# Patient Record
Sex: FEMALE | Race: WHITE | NOT HISPANIC OR LATINO | Employment: OTHER | URBAN - METROPOLITAN AREA
[De-identification: names, ages, dates, MRNs, and addresses within clinical notes are randomized per-mention and may not be internally consistent; named-entity substitution may affect disease eponyms.]

---

## 2021-09-29 ENCOUNTER — CLINICAL SUPPORT (OUTPATIENT)
Dept: URGENT CARE | Facility: CLINIC | Age: 66
End: 2021-09-29
Payer: COMMERCIAL

## 2021-09-29 DIAGNOSIS — Z11.52 ENCOUNTER FOR SCREENING FOR COVID-19: Primary | ICD-10-CM

## 2021-09-29 PROCEDURE — U0002: ICD-10-PCS | Mod: QW,S$GLB,, | Performed by: NURSE PRACTITIONER

## 2021-09-29 PROCEDURE — U0002 COVID-19 LAB TEST NON-CDC: HCPCS | Mod: QW,S$GLB,, | Performed by: NURSE PRACTITIONER

## 2021-09-29 PROCEDURE — 99211 PR OFFICE/OUTPT VISIT, EST, LEVL I: ICD-10-PCS | Mod: S$GLB,,, | Performed by: NURSE PRACTITIONER

## 2021-09-29 PROCEDURE — 99211 OFF/OP EST MAY X REQ PHY/QHP: CPT | Mod: S$GLB,,, | Performed by: NURSE PRACTITIONER

## 2021-10-01 LAB
CTP QC/QA: YES
SARS-COV-2 RDRP RESP QL NAA+PROBE: NEGATIVE

## 2021-12-02 PROBLEM — N95.1 MENOPAUSAL SYNDROME: Status: ACTIVE | Noted: 2021-12-02

## 2025-01-13 ENCOUNTER — TELEPHONE (OUTPATIENT)
Dept: ORTHOPEDICS | Facility: CLINIC | Age: 70
End: 2025-01-13
Payer: COMMERCIAL

## 2025-01-13 NOTE — TELEPHONE ENCOUNTER
Spoke with pt informed pt she needs to get her images on a disc from previous clinic bring to our clinic to get uploaded to her chart pt v/u.

## 2025-01-13 NOTE — TELEPHONE ENCOUNTER
----- Message from Katja sent at 1/13/2025  8:14 AM CST -----  Contact: patient  Diana Rogel would like a call back [phone], in regards to her appt scheduled on 1/16. Pt states she recently had xrays on her foot at [facility],and she would like for the images to be requested.

## 2025-01-15 NOTE — PROGRESS NOTES
Dr. Halley Hollingsworth      [address], [place] [address]   Phone [phone]  Fax [phone]           CHIEF COMPLAINT: Injury, Pain, and Swelling of the Right Foot (Pain:5/10/DOI:01/11/2025)       HISTORY OF PRESENT ILLNESS (JN) (01/16/2025):    Diana presents with a right foot injury that occurred on January 12th while putting up [employer] decorations. She tripped on a long basket with heavy handles, catching her foot in the handle and falling over while carrying a box. She believes her foot twisted more than just falling forward. She rates her pain as 5 out of 6 in severity. Diana delayed seeking immediate care due to cataract surgery on the following Tuesday. She reports sensitivity in the area, but no bruising underneath. She has a tingling, stinging sensation when lifting her toes. Swelling was mainly in the midfoot area towards the toes, with slight discoloration described as slightly pinker than her other foot. Diana is concerned about the right foot injury affecting her ability to drive.  She was seen at [place] after hours.    She denies injuring anything else besides her foot.      SURGICAL HISTORY:  - Cataract surgery: Tuesday (recent)          PAST MEDICAL HISTORY:    Past Medical History:   Diagnosis Date    Anxiety     Cervical dysplasia     CIN1    Cystocele with rectocele     Depression     H/O umbilical hernia repair     Hyperlipidemia     Hypertension     Hypothyroidism     Insulin resistance     Menopausal syndrome     Osteoporosis     hip    Uterine prolapse     Vulvar abscess        Hemoglobin A1C   Date Value Ref Range Status   11/15/2024 5.9 (H) 4.2 - 5.8 % Final   07/03/2024 5.9 (H) 4.2 - 5.8 % Final   02/16/2024 5.7 4.2 - 5.8 % Final        PAST SURGICAL HISTORY:    Past Surgical History:   Procedure Laterality Date    ANTERIOR VAGINAL REPAIR      COLPOSCOPY      HYSTERECTOMY      HYSTERECTOMY, VAGINAL, LAPAROSCOPY-ASSISTED, WITH SALPINGO-OOPHORECTOY       LAVH/BSO, A&P REPAIR    OPERATION ON BARTHOLIN'S GLAND      DRAINAGE    POSTERIOR REPAIR      UMBILICAL HERNIA REPAIR          MEDICATIONS:    Current Outpatient Medications:     busPIRone (BUSPAR) 15 MG tablet, buspirone 15 mg oral tablet take 1 tablet (15 mg) by oral route 2 times per day   Active, Disp: , Rfl:     calcium carbonate/vitamin D3 (CALCIUM 600 + D,3, ORAL), , Disp: , Rfl:     citalopram (CELEXA) 40 MG tablet, Celexa 40 mg oral tablet take 1 tablet (40 mg) by oral route once daily   Active, Disp: , Rfl:     levothyroxine (SYNTHROID) 75 MCG tablet, levothyroxine 75 mcg oral tablet take 1 tablet (75 mcg) by oral route once daily   Active, Disp: , Rfl:     nortriptyline (PAMELOR) 10 MG capsule, Take 10 mg by mouth every evening., Disp: , Rfl:     simvastatin (ZOCOR) 20 MG tablet, , Disp: , Rfl:     alendronate (FOSAMAX) 70 MG tablet, , Disp: , Rfl:      There are no discontinued medications.      ALLERGIES:     Review of patient's allergies indicates:   Allergen Reactions    Cephalexin Rash            FAMILY HISTORY:   Family History   Problem Relation Name Age of Onset    Breast cancer Other RELATIVE     Cervical cancer Other RELATIVE     Uterine cancer Other RELATIVE     Diabetes Other RELATIVE            SOCIAL HISTORY:    Social History     Socioeconomic History    Marital status:    Tobacco Use    Smoking status: Never    Smokeless tobacco: Never   Substance and Sexual Activity    Alcohol use: Yes    Drug use: Never    Sexual activity: Yes     Partners: Male     Social Drivers of Health     Financial Resource Strain: Low Risk  (8/2/2023)    Received from [facility] and Its Subsidiaries and Affiliates    Overall Financial Resource Strain (CARDIA)     Difficulty of Paying Living Expenses: Not hard at all   Food Insecurity: No Food Insecurity (8/2/2023)    Received from [facility] and Its Subsidiaries and Affiliates     "Hunger Vital Sign     Worried About Running Out of Food in the Last Year: Never true     Ran Out of Food in the Last Year: Never true   Transportation Needs: No Transportation Needs (8/2/2023)    [facility]    PRAPARE - Transportation     Lack of Transportation (Medical): No     Lack of Transportation (Non-Medical): No   Physical Activity: Sufficiently Active (8/2/2023)    [facility]    Exercise Vital Sign     Days of Exercise per Week: 4 days     Minutes of Exercise per Session: 60 min   Stress: Stress Concern Present (8/2/2023)    Received from [facility]idi[facility] and Aff[facility]ates    [place] [facility] [employer] - Occupational Stress Questionnaire     Feeling of Stress : To some extent   Housing Stability: Low Risk  (8/2/2023)    [facility]    Housing Stability Vital Sign     Unable to Pay for Housing in the Last Year: No     Number of Places Lived in the Last Year: 1     Unstable Housing in the Last Year: No       PHYSICAL EXAMINATION:     Estimated body mass index is 27.46 kg/m² as calculated from the following:    Height as of this encounter: 5' 5" (1.651 m).    Weight as of this encounter: 74.8 kg (165 lb).   ASSISTIVE DEVICE:  Walking boot    MUSCULOSKELETAL:    Ankle Exam (right extremity):   Inspection:         Gross deformity   (-)         Erythema   (-)        Ecchymosis   (-)          Swelling   (+) mild midfoot           Open wounds   (-)         Surgical scars   (-)                    Standing alignment:  Not tested   Palpation tenderness:  Bony:  Hindfoot:  Proximal fibula  (-)  Calcaneus  (-)   Distal fibula  (-)  Talus   (-)   Medial malleolus  (-)  CC joint/cuboid  (-) "   Tibiotalar joint  (-)  Lateral gutter  (-)  Plantar fascia  (-)  Medial gutter  (-)   Midfoot:   TN joint   (+)   NC joints   (-)   TMT joints   (-)   Forefoot:   (-)      Soft tissue:     Tendons:    Achilles midsubstance (-)  Peroneal tendons  (-)   Achilles insertion  (-)  Posterior tibial tendon (-)   Retrocalcaneal bursa (-)  Anterior tibial tendon  (-)   Ligaments:   ATFL   (-)  Deltoid   (-)   CFL   (-)  Syndesmosis  (-)  ROM:  Affected Ankle:         DF:    10°        PF:    40°                 Pain    (-)       Crepitance   (-)       Sensory:  Normal sensation to light touch in Sa/Murray/DP/SP/T nerve distributions      Motor:               Fires EHL/FHL/Tibialis anterior/Gastrocsoleus   Vascular:  Foot WWP with brisk capillary refill    DP/PT pulses palpable  Special Tests:  Hampton   (-)  Anterior drawer   (-)  Calcaneal squeeze  (-)  Syndesmotic squeeze  (-)  She does have tenderness over her Lisfranc joint but no plantar ecchymosis          IMAGING:      Mammo Digital Screening Bilat w/ All  Narrative: Result:   Mammo Digital Screening Bilat w/ All     History:  Patient is 66 y.o. and is seen for a screening mammogram.    Films Compared:  Prior images (if available) were compared.     Findings:  This procedure was performed using tomosynthesis. Computer-aided detection   was utilized in the interpretation of this examination.  The breasts have scattered areas of fibroglandular density. There is no   evidence of suspicious masses, calcifications, or other abnormal findings.  Impression: Bilateral  There is no mammographic evidence of malignancy.    BI-RADS Category:   Overall: 2 - Benign     Recommendation:  Routine screening mammogram in 1 year is recommended.      She brought radiographs from [place] which were uploaded to our system.  I do not see any clear fractures and those.     ASSESSMENT: 69 y.o. female  with:   Right midfoot sprain sustained 01/12/2025 with no widening on standing radiographs  obtained 01/16/2020  Left midfoot pain with MRI proven first and second TMT and first MTP mild degenerative changes. Left ankle MRI obtained at [facility] on 9/18/23 demonstrates:   No acute abnormality or stress fracture evident.  Mild/moderate TMT joint degenerative changes. Moderate focal talar dome chondrosis with trace subchondral edema. Findings detailed above.  Minimal Achilles tendinosis.    PLAN:   Data:  I independently visualized and interpreted her outside images and today's images  I reviewed available old/outside records.  Medications:   OTC NSAIDs   DME and weight bearing status:   Walking boot for comfort  Continue [place] Bond[place] for her midfoot arthritis when she weans out of the boot  Recommended she get New [employer] or [employer] shoes  Ice down and elevate after activity  Advanced imaging:  Not necessary at this time.  I suspect she has a partial Lisfranc ligamentous injury but has no instability on standing films.  Education:   I had a long discussion with the patient about the causes, treatments, and prognosis/natural history for midfoot pain.  We discussed effective ways to improve symptoms as well as what types of activities may make the symptoms/prognosis worse. We discussed signs and symptoms and other reasons to return to clinic sooner.   Return to clinic:   2-3 weeks  Imaging needed at next follow-up: None    This note was generated with the assistance of ambient listening technology. Verbal consent was obtained by the patient and accompanying visitor(s) for the recording of patient appointment to facilitate this note. I attest to having reviewed and edited the generated note for accuracy, though some syntax or spelling errors may persist. Please contact the author of this note for any clarification.              Physician Signature: Halley Hollingsworth M.D.       Official Website  Schedule An Appointment

## 2025-01-16 ENCOUNTER — HOSPITAL ENCOUNTER (OUTPATIENT)
Dept: RADIOLOGY | Facility: HOSPITAL | Age: 70
Discharge: HOME | End: 2025-01-16
Attending: ORTHOPAEDIC SURGERY
Payer: COMMERCIAL

## 2025-01-16 ENCOUNTER — OFFICE VISIT (OUTPATIENT)
Dept: ORTHOPEDICS | Facility: CLINIC | Age: 70
End: 2025-01-16
Payer: COMMERCIAL

## 2025-01-16 VITALS — BODY MASS INDEX: 27.49 KG/M2 | HEIGHT: 65 IN | WEIGHT: 165 LBS

## 2025-01-16 DIAGNOSIS — M79.671 RIGHT FOOT PAIN: ICD-10-CM

## 2025-01-16 DIAGNOSIS — M79.671 RIGHT FOOT PAIN: Primary | ICD-10-CM

## 2025-01-16 DIAGNOSIS — S93.601A RIGHT FOOT SPRAIN, INITIAL ENCOUNTER: ICD-10-CM

## 2025-01-16 PROCEDURE — 1159F MED LIST DOCD IN RCRD: CPT | Mod: CPTII,S$GLB,, | Performed by: ORTHOPAEDIC SURGERY

## 2025-01-16 PROCEDURE — 73630 X-RAY EXAM OF FOOT: CPT | Mod: TC,RT

## 2025-01-16 PROCEDURE — 3008F BODY MASS INDEX DOCD: CPT | Mod: CPTII,S$GLB,, | Performed by: ORTHOPAEDIC SURGERY

## 2025-01-16 PROCEDURE — 1100F PTFALLS ASSESS-DOCD GE2>/YR: CPT | Mod: CPTII,S$GLB,, | Performed by: ORTHOPAEDIC SURGERY

## 2025-01-16 PROCEDURE — 99999 PR PBB SHADOW E&M-EST. PATIENT-LVL III: CPT | Mod: PBBFAC,,, | Performed by: ORTHOPAEDIC SURGERY

## 2025-01-16 PROCEDURE — 1125F AMNT PAIN NOTED PAIN PRSNT: CPT | Mod: CPTII,S$GLB,, | Performed by: ORTHOPAEDIC SURGERY

## 2025-01-16 PROCEDURE — 99204 OFFICE O/P NEW MOD 45 MIN: CPT | Mod: S$GLB,,, | Performed by: ORTHOPAEDIC SURGERY

## 2025-01-16 PROCEDURE — 73630 X-RAY EXAM OF FOOT: CPT | Mod: 26,RT,, | Performed by: RADIOLOGY

## 2025-01-16 PROCEDURE — 3288F FALL RISK ASSESSMENT DOCD: CPT | Mod: CPTII,S$GLB,, | Performed by: ORTHOPAEDIC SURGERY

## 2025-01-16 RX ORDER — NORTRIPTYLINE HYDROCHLORIDE 10 MG/1
10 CAPSULE ORAL NIGHTLY
COMMUNITY

## 2025-01-16 NOTE — PATIENT INSTRUCTIONS
Your feedback means a lot to us! If you have a moment, please leave a review to help others learn about your experience. Thank you!

## 2025-02-04 NOTE — PROGRESS NOTES
[address] [facility] [place] [address]   Phone [phone]  Fax [phone]           CHIEF COMPLAINT: Follow-up of the Right Foot (Pain:5/10/DOI:01/11/2025)  HISTORY OF PRESENT ILLNESS JN (02/07/2025):    Diana presents for follow-up of a foot injury that occurred approximately 3.5 weeks ago. She reports a partial Lisfranc sprain in her foot. Initially, she had shooting pain, which has now subsided to occasional twinges of discomfort. She states that there are occasionally uncomfortable twinges, but it's not the shooting pain that it was before.    She still has some tenderness when pressure is applied to the affected ligament, which she describes as just uncomfortable rather than the shooting pain experienced earlier in the injury. She has not yet attempted to walk barefoot or run with shoes on.    She has been wearing supportive footwear, including a specialized shoe initially, and has now transitioned to a different type of supportive shoe. She expresses concern about returning to playing pickleball and acknowledges the need to give the injury more time to heal. Her teammates are encouraging an early return, but she is hesitant and considering waiting an additional week to reassess.    Prior to this foot injury, she recently recovered from pneumonia, which developed after placido influenza A from one of her children. She reports feeling extremely ill during this time, stating that she thought she was going to die. She now reports feeling better.    Diana denies any formal medical diagnoses.    PREVIOUS TREATMENTS:  - Diana has been wearing supportive shoes for approximately 3-3.5 weeks, which has provided some benefit. Initially, she alternated between a specialized shoe and the current supportive shoe for the first week.    FAMILY HISTORY:  - Grandson: Tonsillectomy, third tube placement, and second adenoid removal. The adenoids grew back after the second tube surgery.              HISTORY OF PRESENT ILLNESS (JN) (01/16/2025):    Diana presents with a right foot injury that occurred on January 12th while putting up [employer] decorations. She tripped on a long basket with heavy handles, catching her foot in the handle and falling over while carrying a box. She believes her foot twisted more than just falling forward. She rates her pain as 5 out of 6 in severity. Diana delayed seeking immediate care due to cataract surgery on the following Tuesday. She reports sensitivity in the area, but no bruising underneath. She has a tingling, stinging sensation when lifting her toes. Swelling was mainly in the midfoot area towards the toes, with slight discoloration described as slightly pinker than her other foot. Diana is concerned about the right foot injury affecting her ability to drive.  She was seen at [place] after hours.    She denies injuring anything else besides her foot.      SURGICAL HISTORY:  - Cataract surgery: Tuesday (recent)          PAST MEDICAL HISTORY:    Past Medical History:   Diagnosis Date    Anxiety     Cervical dysplasia     CIN1    Cystocele with rectocele     Depression     H/O umbilical hernia repair     Hyperlipidemia     Hypertension     Hypothyroidism     Insulin resistance     Menopausal syndrome     Osteoporosis     hip    Uterine prolapse     Vulvar abscess        Hemoglobin A1C   Date Value Ref Range Status   11/15/2024 5.9 (H) 4.2 - 5.8 % Final   07/03/2024 5.9 (H) 4.2 - 5.8 % Final   02/16/2024 5.7 4.2 - 5.8 % Final        PAST SURGICAL HISTORY:    Past Surgical History:   Procedure Laterality Date    ANTERIOR VAGINAL REPAIR      COLPOSCOPY      HYSTERECTOMY      HYSTERECTOMY, VAGINAL, LAPAROSCOPY-ASSISTED, WITH SALPINGO-OOPHORECTOY      LAVH/BSO, A&P REPAIR    OPERATION ON BARTHOLIN'S GLAND      DRAINAGE    POSTERIOR REPAIR      UMBILICAL HERNIA REPAIR          MEDICATIONS:    Current Outpatient Medications:     alendronate (FOSAMAX) 70 MG tablet, , Disp:  , Rfl:     busPIRone (BUSPAR) 15 MG tablet, buspirone 15 mg oral tablet take 1 tablet (15 mg) by oral route 2 times per day   Active, Disp: , Rfl:     calcium carbonate/vitamin D3 (CALCIUM 600 + D,3, ORAL), , Disp: , Rfl:     citalopram (CELEXA) 40 MG tablet, Celexa 40 mg oral tablet take 1 tablet (40 mg) by oral route once daily   Active, Disp: , Rfl:     levothyroxine (SYNTHROID) 75 MCG tablet, levothyroxine 75 mcg oral tablet take 1 tablet (75 mcg) by oral route once daily   Active, Disp: , Rfl:     nortriptyline (PAMELOR) 10 MG capsule, Take 10 mg by mouth every evening., Disp: , Rfl:     simvastatin (ZOCOR) 20 MG tablet, , Disp: , Rfl:      There are no discontinued medications.      ALLERGIES:     Review of patient's allergies indicates:   Allergen Reactions    Cephalexin Rash            FAMILY HISTORY:   Family History   Problem Relation Name Age of Onset    Breast cancer Other RELATIVE     Cervical cancer Other RELATIVE     Uterine cancer Other RELATIVE     Diabetes Other RELATIVE            SOCIAL HISTORY:    Social History     Socioeconomic History    Marital status:    Tobacco Use    Smoking status: Never    Smokeless tobacco: Never   Substance and Sexual Activity    Alcohol use: Yes    Drug use: Never    Sexual activity: Yes     Partners: Male     Social Drivers of Health     Financial Resource Strain: Low Risk  (8/2/2023)    [facility] Aff[facility]ates    Overall Financial Resource Strain (CARDIA)     Difficulty of Paying Living Expenses: Not hard at all   Food Insecurity: No Food Insecurity (8/2/2023)    Received from [facility] Its Subsidiaries and Affiliates    Hunger Vital Sign     Worried About Running Out of Food in the Last Year: Never true     Ran Out of Food in the Last Year: Never true   Transportation Needs: No Transportation Needs (8/2/2023)    [facility] [employer]  [facility]    PRAPARE - Transportation     Lack of Transportation (Medical): No     Lack of Transportation (Non-Medical): No   Physical Activity: Sufficiently Active (8/2/2023)    [facility]    Exercise Vital Sign     Days of Exercise per Week: 4 days     Minutes of Exercise per Session: 60 min   Stress: Stress Concern Present (8/2/2023)    [facility]    [place] of Occupational Health - Occupational Stress Questionnaire     Feeling of Stress : To some extent   Housing Stability: Low Risk  (8/2/2023)    [facility]    Housing Stability Vital Sign     Unable to Pay for Housing in the Last Year: No     Number of Places Lived in the Last Year: 1     Unstable Housing in the Last Year: No       PHYSICAL EXAMINATION:     Estimated body mass index is 27.46 kg/m² as calculated from the following:    Height as of this encounter: 5' 5" (1.651 m).    Weight as of this encounter: 74.8 kg (165 lb).   ASSISTIVE DEVICE:  [place] shoes  MUSCULOSKELETAL:    Ankle Exam (right extremity):   Inspection:         Gross deformity   (-)         Erythema   (-)        Ecchymosis   (-)          Swelling   (+) mild midfoot           Open wounds   (-)         Surgical scars   (-)                    Standing alignment:  Not tested   Palpation tenderness:  Bony:  Hindfoot:  Proximal fibula  (-)  Calcaneus  (-)   Distal fibula  (-)  Talus   (-)   Medial malleolus  (-)  CC joint/cuboid  (-)   Tibiotalar joint  (-)  Lateral gutter  (-)  Plantar fascia  (-)  Medial gutter  (-)   Midfoot:   TN joint   (+)  continued over Lisfranc joint from last visit but did improve  NC joints   (-)   TMT joints   (-) "   Forefoot:   (-)      Soft tissue:     Tendons:    Achilles midsubstance (-)  Peroneal tendons  (-)   Achilles insertion  (-)  Posterior tibial tendon (-)   Retrocalcaneal bursa (-)  Anterior tibial tendon  (-)   Ligaments:   ATFL   (-)  Deltoid   (-)   CFL   (-)  Syndesmosis  (-)  ROM:  Affected Ankle:         DF:    10°        PF:    40°                 Pain    (-)       Crepitance   (-)       Sensory:  Normal sensation to light touch in Sa/Murray/DP/SP/T nerve distributions      Motor:               Fires EHL/FHL/Tibialis anterior/Gastrocsoleus   Vascular:  Foot WWP with brisk capillary refill    DP/PT pulses palpable  Special Tests:  Hampton   (-)  Anterior drawer   (-)  Calcaneal squeeze  (-)  Syndesmotic squeeze  (-)         IMAGING:      X-Ray Foot Complete 3 view Right  Narrative: EXAM: XR FOOT COMPLETE 3 VIEW RIGHT    CLINICAL HISTORY: Pain    COMPARISON: None    TECHNIQUE:  4 views of the right foot were performed.    FINDINGS: No fracture, dislocation or radiopaque foreign bodies are identified.  No significant soft tissue swelling is appreciated.  There is relative joint space narrowing visible at the first MTP joint.  Impression: 1.  No evidence of osseous injury.  2.  Mild degenerative joint space narrowing at the first MTP joint.    Finalized on: 1/16/2025 10:58 AM By:  Bubba EDEN# [id number]      2025-01-16 11:00:52.184    BRSUDARSHANG      She brought radiographs from [place] which were uploaded to our system.  I do not see any clear fractures and those.     ASSESSMENT: 69 y.o. female  with:   Right midfoot sprain sustained 01/12/2025 with no widening on standing radiographs obtained 01/16/2020  Left midfoot pain with MRI proven first and second TMT and first MTP mild degenerative changes. Left ankle MRI obtained at [facility] on 9/18/23 demonstrates:   No acute abnormality or stress fracture evident.  Mild/moderate TMT joint degenerative changes. Moderate focal talar dome  chondrosis with trace subchondral edema. Findings detailed above.  Minimal Achilles tendinosis.    PLAN:   Data:  I reviewed available old/outside records.  Medications:   OTC NSAIDs   Her pain is 1/10 today  DME and weight bearing status:   She has transition out of the walking boot in his now in [place].    She can see she can walk barefoot and she should be able to run in her [place] prior to transitioning back to pickleball  Advanced imaging:  Not necessary at this time.  I suspect she has a partial Lisfranc ligamentous injury but has no instability on standing films.  Education:   I had a long discussion with the patient about the causes, treatments, and prognosis/natural history for midfoot pain.  We discussed effective ways to improve symptoms as well as what types of activities may make the symptoms/prognosis worse. We discussed signs and symptoms and other reasons to return to clinic sooner.   Return to clinic:   As needed  Imaging needed at next follow-up: None    This note was generated with the assistance of ambient listening technology. Verbal consent was obtained by the patient and accompanying visitor(s) for the recording of patient appointment to facilitate this note. I attest to having reviewed and edited the generated note for accuracy, though some syntax or spelling errors may persist. Please contact the author of this note for any clarification.              Physician Signature: Halley Hollingsworth M.D.       Official Website  Schedule An Appointment

## 2025-02-07 ENCOUNTER — OFFICE VISIT (OUTPATIENT)
Dept: ORTHOPEDICS | Facility: CLINIC | Age: 70
End: 2025-02-07
Payer: COMMERCIAL

## 2025-02-07 VITALS — BODY MASS INDEX: 27.49 KG/M2 | HEIGHT: 65 IN | WEIGHT: 165 LBS

## 2025-02-07 DIAGNOSIS — S93.601A RIGHT FOOT SPRAIN, INITIAL ENCOUNTER: Primary | ICD-10-CM

## 2025-02-07 PROCEDURE — 1125F AMNT PAIN NOTED PAIN PRSNT: CPT | Mod: CPTII,S$GLB,, | Performed by: ORTHOPAEDIC SURGERY

## 2025-02-07 PROCEDURE — 99999 PR PBB SHADOW E&M-EST. PATIENT-LVL III: CPT | Mod: PBBFAC,,, | Performed by: ORTHOPAEDIC SURGERY

## 2025-02-07 PROCEDURE — 1101F PT FALLS ASSESS-DOCD LE1/YR: CPT | Mod: CPTII,S$GLB,, | Performed by: ORTHOPAEDIC SURGERY

## 2025-02-07 PROCEDURE — 1159F MED LIST DOCD IN RCRD: CPT | Mod: CPTII,S$GLB,, | Performed by: ORTHOPAEDIC SURGERY

## 2025-02-07 PROCEDURE — 3288F FALL RISK ASSESSMENT DOCD: CPT | Mod: CPTII,S$GLB,, | Performed by: ORTHOPAEDIC SURGERY

## 2025-02-07 PROCEDURE — 99214 OFFICE O/P EST MOD 30 MIN: CPT | Mod: S$GLB,,, | Performed by: ORTHOPAEDIC SURGERY

## 2025-02-07 PROCEDURE — 3008F BODY MASS INDEX DOCD: CPT | Mod: CPTII,S$GLB,, | Performed by: ORTHOPAEDIC SURGERY

## 2025-05-01 NOTE — PROGRESS NOTES
"           [address] [facility] [place] [address]   Phone [phone]  Fax [phone]           CHIEF COMPLAINT: Injury of the Right Foot  HISTORY OF PRESENT ILLNESS JN (05/02/2025):  History of Present Illness    HPI:  Diana presents with a sensation in her right second toe for approximately three weeks. She describes it as feeling like a string attached to it when going up or down, characterizing it as "nervy" but not painful. She compares the sensation to having a hair wrapped around a finger. The sensation is more pronounced in the second toe than the great toe and does not extend to the other toes.    She reports ongoing issues with a previous Lisfranc injury in the same foot. She normally wears a supportive shoe but still feels discomfort related to the Lisfranc injury when walking. Some swelling is noted between the toe and Lisfranc joint    She has attempted to return to exercise, including using a recumbent bike, treadmill, and weights at the gym, but does not believe these activities have exacerbated her condition. She denies any recent injuries or calf pain. She expresses concern about the impact on her activities, stating that she exercises, plays pickleball, and wants to maintain functional feet for her grandchildren.    She denies any new injury.  Her pain today was not rated.         Disclaimer: The history above was obtained through ambient listening technology thus may contain errors.     HISTORY OF PRESENT ILLNESS JN (02/07/2025):  Diana presents for follow-up of a foot injury that occurred approximately 3.5 weeks ago. She reports a partial Lisfranc sprain in her foot. Initially, she had shooting pain, which has now subsided to occasional twinges of discomfort. She states that there are occasionally uncomfortable twinges, but it's not the shooting pain that it was before.    She still has some tenderness when pressure is applied to the affected ligament, which she describes as " just uncomfortable rather than the shooting pain experienced earlier in the injury. She has not yet attempted to walk barefoot or run with shoes on.    She has been wearing supportive footwear, including a specialized shoe initially, and has now transitioned to a different type of supportive shoe. She expresses concern about returning to playing pickleball and acknowledges the need to give the injury more time to heal. Her teammates are encouraging an early return, but she is hesitant and considering waiting an additional week to reassess.    Prior to this foot injury, she recently recovered from pneumonia, which developed after placido influenza A from one of her children. She reports feeling extremely ill during this time, stating that she thought she was going to die. She now reports feeling better.    Diana denies any formal medical diagnoses.    PREVIOUS TREATMENTS:  - Diana has been wearing supportive shoes for approximately 3-3.5 weeks, which has provided some benefit. Initially, she alternated between a specialized shoe and the current supportive shoe for the first week.    FAMILY HISTORY:  - Grandson: Tonsillectomy, third tube placement, and second adenoid removal. The adenoids grew back after the second tube surgery.             HISTORY OF PRESENT ILLNESS (JN) (01/16/2025):    Diana presents with a right foot injury that occurred on January 12th while putting up [employer]. She tripped on a long basket with heavy handles, catching her foot in the handle and falling over while carrying a box. She believes her foot twisted more than just falling forward. She rates her pain as 5 out of 6 in severity. Diana delayed seeking immediate care due to cataract surgery on the following Tuesday. She reports sensitivity in the area, but no bruising underneath. She has a tingling, stinging sensation when lifting her toes. Swelling was mainly in the midfoot area towards the toes, with slight  discoloration described as slightly pinker than her other foot. Diana is concerned about the right foot injury affecting her ability to drive.  She was seen at [place] after hours.    She denies injuring anything else besides her foot.      SURGICAL HISTORY:  - Cataract surgery: Tuesday (recent)          PAST MEDICAL HISTORY:    Past Medical History:   Diagnosis Date    Anxiety     Cervical dysplasia     CIN1    Cystocele with rectocele     Depression     H/O umbilical hernia repair     Hyperlipidemia     Hypertension     Hypothyroidism     Insulin resistance     Menopausal syndrome     Osteoporosis     hip    Uterine prolapse     Vulvar abscess        Hemoglobin A1C   Date Value Ref Range Status   11/15/2024 5.9 (H) 4.2 - 5.8 % Final   07/03/2024 5.9 (H) 4.2 - 5.8 % Final   02/16/2024 5.7 4.2 - 5.8 % Final        PAST SURGICAL HISTORY:    Past Surgical History:   Procedure Laterality Date    ANTERIOR VAGINAL REPAIR      COLPOSCOPY      HYSTERECTOMY      HYSTERECTOMY, VAGINAL, LAPAROSCOPY-ASSISTED, WITH SALPINGO-OOPHORECTOY      LAVH/BSO, A&P REPAIR    OPERATION ON BARTHOLIN'S GLAND      DRAINAGE    POSTERIOR REPAIR      UMBILICAL HERNIA REPAIR          MEDICATIONS:    Current Outpatient Medications:     busPIRone (BUSPAR) 15 MG tablet, buspirone 15 mg oral tablet take 1 tablet (15 mg) by oral route 2 times per day   Active, Disp: , Rfl:     calcium carbonate/vitamin D3 (CALCIUM 600 + D,3, ORAL), , Disp: , Rfl:     citalopram (CELEXA) 40 MG tablet, Celexa 40 mg oral tablet take 1 tablet (40 mg) by oral route once daily   Active, Disp: , Rfl:     levothyroxine (SYNTHROID) 75 MCG tablet, levothyroxine 75 mcg oral tablet take 1 tablet (75 mcg) by oral route once daily   Active, Disp: , Rfl:     nortriptyline (PAMELOR) 10 MG capsule, Take 10 mg by mouth every evening., Disp: , Rfl:     simvastatin (ZOCOR) 20 MG tablet, , Disp: , Rfl:     alendronate (FOSAMAX) 70 MG tablet, , Disp: , Rfl:      There are no discontinued  medications.      ALLERGIES:     Review of patient's allergies indicates:   Allergen Reactions    Cephalexin Rash            FAMILY HISTORY:   Family History   Problem Relation Name Age of Onset    Breast cancer Other RELATIVE     Cervical cancer Other RELATIVE     Uterine cancer Other RELATIVE     Diabetes Other RELATIVE            SOCIAL HISTORY:    Social History     Socioeconomic History    Marital status:    Tobacco Use    Smoking status: Never    Smokeless tobacco: Never   Substance and Sexual Activity    Alcohol use: Yes    Drug use: Never    Sexual activity: Yes     Partners: Male     Social Drivers of Health     Financial Resource Strain: Low Risk  (8/2/2023)    Received from [facility] and Its Subsidiaries and Affiliates    Overall Financial Resource Strain (CARDIA)     Difficulty of Paying Living Expenses: Not hard at all   Food Insecurity: No Food Insecurity (8/2/2023)    [facility] from [facility] and Its Subsidiaries and Affiliates    Hunger Vital Sign     Worried About Running Out of Food in the Last Year: Never true     Ran Out of Food in the Last Year: Never true   Transportation Needs: No Transportation Needs (8/2/2023)    Received from [facility] and Its Subsidiaries and Affiliates    PRAPARE - Transportation     Lack of Transportation (Medical): No     Lack of Transportation (Non-Medical): No   Physical Activity: Sufficiently Active (8/2/2023)    [facility] from [facility] and Its Subsidiaries and Affiliates    Exercise Vital Sign     Days of Exercise per Week: 4 days     Minutes of Exercise per Session: 60 min   Stress: Stress Concern Present (8/2/2023)    Received from [facility] and Its Subsidiaries and Affiliates    [employer] [place] [employer] - Occupational Stress Questionnaire      "Feeling of Stress : To some extent   Housing Stability: Low Risk  (8/2/2023)    Received from [facility] and Its Subsidiaries and Affiliates    Housing Stability Vital Sign     Unable to Pay for Housing in the Last Year: No     Number of Places Lived in the Last Year: 1     Unstable Housing in the Last Year: No       PHYSICAL EXAMINATION:     Estimated body mass index is 27.44 kg/m² as calculated from the following:    Height as of this encounter: 5' 5" (1.651 m).    Weight as of this encounter: 74.8 kg (164 lb 14.5 oz).   ASSISTIVE DEVICE:  Hoka shoes  MUSCULOSKELETAL:    Ankle Exam (right extremity):   Inspection:         Gross deformity   (-)         Erythema   (-)        Ecchymosis   (-)          Swelling   (+) none in midfoot.  She has moderate localized between her 1st MTP joint and the Lisfranc joint      Open wounds   (-)         Surgical scars   (-)                    Standing alignment:  Not tested   Palpation tenderness:  Bony:  Hindfoot:  Proximal fibula  (-)  Calcaneus  (-)   Distal fibula  (-)  Talus   (-)   Medial malleolus  (-)  CC joint/cuboid  (-)   Tibiotalar joint  (-)  Lateral gutter  (-)  Plantar fascia  (-)  Medial gutter  (-)   Midfoot:   TN joint   (+)  she does still have tenderness over her Lisfranc joint NC joints   (-)   TMT joints   (-)   Forefoot:   (-)      Soft tissue:     Tendons:    Achilles midsubstance (-)  Peroneal tendons  (-)   Achilles insertion  (-)  Posterior tibial tendon (-)   Retrocalcaneal bursa (-)  Anterior tibial tendon  (-)   Ligaments:   ATFL   (-)  Deltoid   (-)   CFL   (-)  Syndesmosis  (-)  ROM:  Affected Ankle:         DF:    10°        PF:    40°                 Pain    (-)       Crepitance   (-)       Sensory:  Normal sensation to light touch in Sa/Murray/DP/SP/T nerve distributions      Motor:               Fires EHL/FHL/Tibialis anterior/Gastrocsoleus   Vascular:  Foot WWP with brisk capillary refill    DP/PT pulses " palpable  Special Tests:  Hampton   (-)  Anterior drawer   (-)  Calcaneal squeeze  (-)  Syndesmotic squeeze  (-)         IMAGING:      X-Ray Foot Complete 3 view Right  Narrative: EXAM: XR FOOT COMPLETE 3 VIEW RIGHT    CLINICAL HISTORY: Pain    COMPARISON: None    TECHNIQUE:  4 views of the right foot were performed.    FINDINGS: No fracture, dislocation or radiopaque foreign bodies are identified.  No significant soft tissue swelling is appreciated.  There is relative joint space narrowing visible at the first MTP joint.  Impression: 1.  No evidence of osseous injury.  2.  Mild degenerative joint space narrowing at the first MTP joint.    Finalized on: 1/16/2025 10:58 AM By:  Bubba Leavitt  [facility]# [id number]      2025-01-16 11:00:52.184    [facility]         ASSESSMENT: 69 y.o. female  with:   Right midfoot sprain sustained 01/12/2025 with no widening on standing radiographs obtained 01/16/2020  Left midfoot pain with MRI proven first and second TMT and first MTP mild degenerative changes. Left ankle MRI obtained at [facility] on 9/18/23 demonstrates:   No acute abnormality or stress fracture evident.  Mild/moderate TMT joint degenerative changes. Moderate focal talar dome chondrosis with trace subchondral edema. Findings detailed above.  Minimal Achilles tendinosis.    PLAN:   Data:  I independently visualized images including but not limited to xrays, MRI, or CT scan today  I reviewed available old/outside records  Medications:   OTC NSAIDs   DME and weight bearing status:   Continue Hoka us  Advanced imaging:  MRI of her midfoot to include the forefoot to evaluate swelling that is not from her 1st MTP arthritis nor her Lisfranc.  It is in between the 2 areas  Education:   I had a long discussion with the patient about the causes, treatments, and prognosis/natural history for their diagnosis. We discussed effective ways to improve symptoms as well as what types of activities may make the  symptoms/prognosis worse. We discussed signs and symptoms and other reasons to return to clinic sooner.  Return to clinic:   After MRI  Imaging needed at next follow-up: None    This note was generated with the assistance of ambient listening technology thus some errors may exist.  Please contact the author of this note for any clarification.           Physician Signature: Halley Hollingsworth M.D.       Official Website  Schedule An Appointment

## 2025-05-02 ENCOUNTER — OFFICE VISIT (OUTPATIENT)
Dept: ORTHOPEDICS | Facility: CLINIC | Age: 70
End: 2025-05-02
Payer: COMMERCIAL

## 2025-05-02 VITALS — BODY MASS INDEX: 27.47 KG/M2 | WEIGHT: 164.88 LBS | HEIGHT: 65 IN

## 2025-05-02 DIAGNOSIS — M79.671 RIGHT FOOT PAIN: Primary | ICD-10-CM

## 2025-05-02 DIAGNOSIS — R22.41 LOCALIZED SWELLING OF RIGHT FOOT: ICD-10-CM

## 2025-05-02 DIAGNOSIS — S93.601D RIGHT FOOT SPRAIN, SUBSEQUENT ENCOUNTER: ICD-10-CM

## 2025-05-02 PROCEDURE — 99999 PR PBB SHADOW E&M-EST. PATIENT-LVL III: CPT | Mod: PBBFAC,,, | Performed by: ORTHOPAEDIC SURGERY

## 2025-05-02 NOTE — PATIENT INSTRUCTIONS
Please call [phone] to schedule your appointment with radiology. At that time, you may also schedule your follow-up appointment with Dr. Hollingsworth as early as two days after your appointment with radiology.

## 2025-05-15 ENCOUNTER — HOSPITAL ENCOUNTER (OUTPATIENT)
Dept: RADIOLOGY | Facility: HOSPITAL | Age: 70
Discharge: HOME | End: 2025-05-15
Attending: ORTHOPAEDIC SURGERY
Payer: COMMERCIAL

## 2025-05-15 DIAGNOSIS — M79.671 RIGHT FOOT PAIN: ICD-10-CM

## 2025-05-15 PROCEDURE — 73718 MRI LOWER EXTREMITY W/O DYE: CPT | Mod: 26,RT,, | Performed by: RADIOLOGY

## 2025-05-15 PROCEDURE — 73718 MRI LOWER EXTREMITY W/O DYE: CPT | Mod: TC,RT

## 2025-05-16 ENCOUNTER — OFFICE VISIT (OUTPATIENT)
Dept: ORTHOPEDICS | Facility: CLINIC | Age: 70
End: 2025-05-16
Payer: COMMERCIAL

## 2025-05-16 ENCOUNTER — RESULTS FOLLOW-UP (OUTPATIENT)
Dept: SPORTS MEDICINE | Facility: CLINIC | Age: 70
End: 2025-05-16

## 2025-05-16 VITALS — WEIGHT: 164.88 LBS | HEIGHT: 65 IN | BODY MASS INDEX: 27.47 KG/M2

## 2025-05-16 DIAGNOSIS — S93.601D RIGHT FOOT SPRAIN, SUBSEQUENT ENCOUNTER: ICD-10-CM

## 2025-05-16 DIAGNOSIS — M79.671 RIGHT FOOT PAIN: Primary | ICD-10-CM

## 2025-05-16 PROCEDURE — 99999 PR PBB SHADOW E&M-EST. PATIENT-LVL III: CPT | Mod: PBBFAC,,, | Performed by: ORTHOPAEDIC SURGERY

## 2025-05-16 PROCEDURE — 1100F PTFALLS ASSESS-DOCD GE2>/YR: CPT | Mod: CPTII,S$GLB,, | Performed by: ORTHOPAEDIC SURGERY

## 2025-05-16 PROCEDURE — 3008F BODY MASS INDEX DOCD: CPT | Mod: CPTII,S$GLB,, | Performed by: ORTHOPAEDIC SURGERY

## 2025-05-16 PROCEDURE — 1126F AMNT PAIN NOTED NONE PRSNT: CPT | Mod: CPTII,S$GLB,, | Performed by: ORTHOPAEDIC SURGERY

## 2025-05-16 PROCEDURE — 99214 OFFICE O/P EST MOD 30 MIN: CPT | Mod: S$GLB,,, | Performed by: ORTHOPAEDIC SURGERY

## 2025-05-16 PROCEDURE — 3288F FALL RISK ASSESSMENT DOCD: CPT | Mod: CPTII,S$GLB,, | Performed by: ORTHOPAEDIC SURGERY

## 2025-05-16 PROCEDURE — 1159F MED LIST DOCD IN RCRD: CPT | Mod: CPTII,S$GLB,, | Performed by: ORTHOPAEDIC SURGERY

## 2025-05-16 NOTE — PROGRESS NOTES
"           [address] [facility] [place] [address]   Phone [phone]  Fax [phone]           CHIEF COMPLAINT: Injury of the Right Foot  HISTORY OF PRESENT ILLNESS JN (05/16/2025):  History of Present Illness    HPI:  Diana presents for follow-up regarding an MRI of her foot. She reports a persistent sensation in her foot, primarily affecting her great toe, which she describes as unusual rather than painful. She states it feels as if there is a string around her great toe, pulling in a certain direction. This sensation likely stems from an injury about four months ago.  She rates her pain as 0/10 today.    She has been managing symptoms with Advil or gels as needed, wearing Hoka shoes for daily activities and Asics shoes for pickleball, and using ice. She notes ongoing swelling in her foot, though it has improved slightly. She expresses uncertainty about continuing normal activities and inquires about appropriate activity levels.    She is icing 3 times a day         Disclaimer: The history above was obtained through ambient listening technology thus may contain errors.     HISTORY OF PRESENT ILLNESS JN (05/02/2025):  Diana presents with a sensation in her right second toe for approximately three weeks. She describes it as feeling like a string attached to it when going up or down, characterizing it as "nervy" but not painful. She compares the sensation to having a hair wrapped around a finger. The sensation is more pronounced in the second toe than the great toe and does not extend to the other toes.    She reports ongoing issues with a previous Lisfranc injury in the same foot. She normally wears a supportive shoe but still feels discomfort related to the Lisfranc injury when walking. Some swelling is noted between the toe and Lisfranc joint    She has attempted to return to exercise, including using a recumbent bike, treadmill, and weights at the gym, but does not believe these activities " have exacerbated her condition. She denies any recent injuries or calf pain. She expresses concern about the impact on her activities, stating that she exercises, plays pickleball, and wants to maintain functional feet for her grandchildren.    She denies any new injury.  Her pain today was not rated.         Disclaimer: The history above was obtained through ambient listening technology thus may contain errors.     HISTORY OF PRESENT ILLNESS JN (02/07/2025):  Diana presents for follow-up of a foot injury that occurred approximately 3.5 weeks ago. She reports a partial Lisfranc sprain in her foot. Initially, she had shooting pain, which has now subsided to occasional twinges of discomfort. She states that there are occasionally uncomfortable twinges, but it's not the shooting pain that it was before.    She still has some tenderness when pressure is applied to the affected ligament, which she describes as just uncomfortable rather than the shooting pain experienced earlier in the injury. She has not yet attempted to walk barefoot or run with shoes on.    She has been wearing supportive footwear, including a specialized shoe initially, and has now transitioned to a different type of supportive shoe. She expresses concern about returning to playing pickleball and acknowledges the need to give the injury more time to heal. Her teammates are encouraging an early return, but she is hesitant and considering waiting an additional week to reassess.    Prior to this foot injury, she recently recovered from pneumonia, which developed after placido influenza A from one of her children. She reports feeling extremely ill during this time, stating that she thought she was going to die. She now reports feeling better.    Diana denies any formal medical diagnoses.    PREVIOUS TREATMENTS:  - Diana has been wearing supportive shoes for approximately 3-3.5 weeks, which has provided some benefit. Initially, she alternated  between a specialized shoe and the current supportive shoe for the first week.    FAMILY HISTORY:  - Grandson: Tonsillectomy, third tube placement, and second adenoid removal. The adenoids grew back after the second tube surgery.             HISTORY OF PRESENT ILLNESS (JN) (01/16/2025):    Diana presents with a right foot injury that occurred on January 12th while putting up [employer] decorations. She tripped on a long basket with heavy handles, catching her foot in the handle and falling over while carrying a box. She believes her foot twisted more than just falling forward. She rates her pain as 5 out of 6 in severity. Diana delayed seeking immediate care due to cataract surgery on the following Tuesday. She reports sensitivity in the area, but no bruising underneath. She has a tingling, stinging sensation when lifting her toes. Swelling was mainly in the midfoot area towards the toes, with slight discoloration described as slightly pinker than her other foot. Diana is concerned about the right foot injury affecting her ability to drive.  She was seen at [facility] after hours.    She denies injuring anything else besides her foot.      SURGICAL HISTORY:  - Cataract surgery: Tuesday (recent)          PAST MEDICAL HISTORY:    Past Medical History:   Diagnosis Date    Anxiety     Cervical dysplasia     CIN1    Cystocele with rectocele     Depression     H/O umbilical hernia repair     Hyperlipidemia     Hypertension     Hypothyroidism     Insulin resistance     Menopausal syndrome     Osteoporosis     hip    Uterine prolapse     Vulvar abscess        Hemoglobin A1C   Date Value Ref Range Status   11/15/2024 5.9 (H) 4.2 - 5.8 % Final   07/03/2024 5.9 (H) 4.2 - 5.8 % Final   02/16/2024 5.7 4.2 - 5.8 % Final        PAST SURGICAL HISTORY:    Past Surgical History:   Procedure Laterality Date    ANTERIOR VAGINAL REPAIR      COLPOSCOPY      HYSTERECTOMY      HYSTERECTOMY, VAGINAL, LAPAROSCOPY-ASSISTED, WITH  SALPINGO-OOPHORECTOY      LAVH/BSO, A&P REPAIR    OPERATION ON BARTHOLIN'S GLAND      DRAINAGE    POSTERIOR REPAIR      UMBILICAL HERNIA REPAIR          MEDICATIONS:    Current Outpatient Medications:     busPIRone (BUSPAR) 15 MG tablet, buspirone 15 mg oral tablet take 1 tablet (15 mg) by oral route 2 times per day   Active, Disp: , Rfl:     calcium carbonate/vitamin D3 (CALCIUM 600 + D,3, ORAL), , Disp: , Rfl:     citalopram (CELEXA) 40 MG tablet, Celexa 40 mg oral tablet take 1 tablet (40 mg) by oral route once daily   Active, Disp: , Rfl:     levothyroxine (SYNTHROID) 75 MCG tablet, levothyroxine 75 mcg oral tablet take 1 tablet (75 mcg) by oral route once daily   Active, Disp: , Rfl:     nortriptyline (PAMELOR) 10 MG capsule, Take 10 mg by mouth every evening., Disp: , Rfl:     simvastatin (ZOCOR) 20 MG tablet, , Disp: , Rfl:     alendronate (FOSAMAX) 70 MG tablet, , Disp: , Rfl:      There are no discontinued medications.      ALLERGIES:     Review of patient's allergies indicates:   Allergen Reactions    Cephalexin Rash            FAMILY HISTORY:   Family History   Problem Relation Name Age of Onset    Breast cancer Other RELATIVE     Cervical cancer Other RELATIVE     Uterine cancer Other RELATIVE     Diabetes Other RELATIVE            SOCIAL HISTORY:    Social History     Socioeconomic History    Marital status:    Tobacco Use    Smoking status: Never    Smokeless tobacco: Never   Substance and Sexual Activity    Alcohol use: Yes    Drug use: Never    Sexual activity: Yes     Partners: Male     Social Drivers of Health     Financial Resource Strain: Low Risk  (8/2/2023)    Received from [facility] Subsidi[facility] and Affiliates    Overall Financial Resource Strain (CARDIA)     Difficulty of Paying Living Expenses: Not hard at all   Food Insecurity: No Food Insecurity (8/2/2023)    [facility]    Hunger Vital Sign     Worried About Running Out of Food in the Last Year: Never true     Ran Out of Food in the Last Year: Never true   Transportation Needs: No Transportation Needs (8/2/2023)    [facility]    PRAPARE - Transportation     Lack of Transportation (Medical): No     Lack of Transportation (Non-Medical): No   Physical Activity: Sufficiently Active (8/2/2023)    [facility]    Exercise Vital Sign     Days of Exercise per Week: 4 days     Minutes of Exercise per Session: 60 min   Stress: Stress Concern Present (8/2/2023)    [facility]    [place] of Occupational [employer] - Occupational Stress Questionnaire     Feeling of Stress : To some extent   Housing Stability: Low Risk  (8/2/2023)    [facility]    Housing Stability Vital Sign     Unable to Pay for Housing in the Last Year: No     Number of Places Lived in the Last Year: 1     Unstable Housing in the Last Year: No       PHYSICAL EXAMINATION:     Estimated body mass index is 27.44 kg/m² as calculated from the following:    Height as of this encounter: 5' 5" (1.651 m).    Weight as of this encounter: 74.8 kg (164 lb 14.5 oz).   ASSISTIVE DEVICE:  Hoka shoes  MUSCULOSKELETAL:    Ankle Exam (right extremity):   Inspection:         Gross deformity   (-)         Erythema   (-)        Ecchymosis   (-)          Swelling   (+) none in midfoot.  She has mild localized between her 1st MTP joint and the Lisfranc joint      Open wounds   (-)         Surgical scars   (-)                    Standing alignment:  Not tested   Palpation tenderness:  Bony:  Hindfoot:  Proximal " fibula  (-)  Calcaneus  (-)   Distal fibula  (-)  Talus   (-)   Medial malleolus  (-)  CC joint/cuboid  (-)   Tibiotalar joint  (-)  Lateral gutter  (-)  Plantar fascia  (-)  Medial gutter  (-)   Midfoot:   TN joint   (+)  she does still have tenderness over her Lisfranc joint with deep palpation  NC joints   (-)   TMT joints   (-)   Forefoot:   (-)      Soft tissue:     Tendons:    Achilles midsubstance (-)  Peroneal tendons  (-)   Achilles insertion  (-)  Posterior tibial tendon (-)   Retrocalcaneal bursa (-)  Anterior tibial tendon  (-)   Ligaments:   ATFL   (-)  Deltoid   (-)   CFL   (-)  Syndesmosis  (-)  ROM:  Affected Ankle:         DF:    10°        PF:    40°                 Pain    (-)       Crepitance   (-)       Sensory:  Normal sensation to light touch in Sa/Murray/DP/SP/T nerve distributions      Motor:               Fires EHL/FHL/Tibialis anterior/Gastrocsoleus   Vascular:  Foot WWP with brisk capillary refill    DP/PT pulses palpable  Special Tests:  [place]pton   (-)  Anterior drawer   (-)  Calcaneal squeeze  (-)  Syndesmotic squeeze  (-)         IMAGING:      MRI Midfoot WO Contrast RT  Addendum: Coronal images of the foot were in fact performed.  The images were not  initially transmitted.  An addendum is now meeting placed on the original  report.  The Lisfranc ligament is clearly visualized and is intact.  Again  there is no suspicious marrow edema seen within the visualized midfoot or  forefoot.  There is fluid surrounding the peroneus longus tendon with  intra tendinous signal seen at the insertion at the base of the 1st  metatarsal.  Findings are most consistent with tendinopathy and  tenosynovitis.     Electronically signed by: Raul Esparza DO   Date:    05/16/2025   Time:    11:27  Narrative: EXAMINATION:  MRI MIDFOOT WO CONTRAST RT    CLINICAL HISTORY:  Foot swelling, nondiabetic, osteomyelitis suspected;evaluate swelling;  Pain in right foot    TECHNIQUE:  Multiplanar multisequence MR imaging  of the right foot was performed without contrast.    COMPARISON:  None    FINDINGS:  Only sagittal T1 and STIR images along with axial STIR images were obtained.  No coronal images of the forefoot or provided.  The tarsal, metatarsal and phalanges of the right foot demonstrate normal T1 marrow signal with no obvious STIR signal identified.  The visualized talus and calcaneus are grossly unremarkable in appearance.  Normal fat signal seen within the visualized sinus tarsi.  The musculature/soft tissue surrounding the forefoot are grossly unremarkable.  Lisfranc ligamentous complex is difficult to visualize as there are no coronal images but as visualized there is no obvious full-thickness defect on the axial images.  Impression: 1. No abnormal marrow signal identified.  2. Limited study for reasons given above.    Electronically signed by: Raul Esparza DO  Date:    05/15/2025  Time:    19:59         ASSESSMENT: 69 y.o. female  with:   Right midfoot sprain sustained 01/12/2025 with no widening on standing radiographs obtained 01/16/2020 with MRI 05/15/2025 demonstrating:  The Lisfranc ligament is clearly visualized and is intact.  Again  there is no suspicious marrow edema seen within the visualized midfoot or  forefoot.  There is fluid surrounding the peroneus longus tendon with  intra tendinous signal seen at the insertion at the base of the 1st  metatarsal.  Findings are most consistent with tendinopathy and  tenosynovitis.  Left midfoot pain with MRI proven first and second TMT and first MTP mild degenerative changes. Left ankle MRI obtained at [facility] on 9/18/23 demonstrates:   No acute abnormality or stress fracture evident.  Mild/moderate TMT joint degenerative changes. Moderate focal talar dome chondrosis with trace subchondral edema. Findings detailed above.  Minimal Achilles tendinosis.    PLAN:   Data:  I independently visualized images including but not limited to xrays, MRI,  or CT scan today  I reviewed available old/outside records  Medications:   She is taking Advil liquid gels when needed  Her pain today as 0/10.  DME and weight bearing status:   Continue Hokas  She is icing 3 times a day.  Advanced imaging:  Her MRI is reassuring  Education:   I had a long discussion with the patient about the causes, treatments, and prognosis/natural history for their diagnosis. We discussed effective ways to improve symptoms as well as what types of activities may make the symptoms/prognosis worse. We discussed signs and symptoms and other reasons to return to clinic sooner.  Return to clinic:   As needed  Imaging needed at next follow-up: None    This note was generated with the assistance of ambient listening technology thus some errors may exist.  Please contact the author of this note for any clarification.           Physician Signature: Halley Hollingsworth M.D.       Official Website  Schedule An Appointment

## 2025-05-16 NOTE — PATIENT INSTRUCTIONS
Your feedback means a lot to Dr. Hollingsworth and her office staff! If you have a moment, please feel free to leave a review specifically about about your experience with Dr. Hollingsworth and her office staff!